# Patient Record
Sex: MALE
[De-identification: names, ages, dates, MRNs, and addresses within clinical notes are randomized per-mention and may not be internally consistent; named-entity substitution may affect disease eponyms.]

---

## 2019-08-04 ENCOUNTER — HOSPITAL ENCOUNTER (EMERGENCY)
Dept: HOSPITAL 5 - ED | Age: 54
LOS: 1 days | Discharge: PSYCHIATRIC OR SUBSTANCE USE FACILITY | End: 2019-08-05
Payer: MEDICAID

## 2019-08-04 DIAGNOSIS — F17.200: ICD-10-CM

## 2019-08-04 DIAGNOSIS — F32.89: Primary | ICD-10-CM

## 2019-08-04 DIAGNOSIS — F14.10: ICD-10-CM

## 2019-08-04 LAB
BASOPHILS # (AUTO): 0.1 K/MM3 (ref 0–0.1)
BASOPHILS NFR BLD AUTO: 1 % (ref 0–1.8)
BUN SERPL-MCNC: 20 MG/DL (ref 9–20)
BUN/CREAT SERPL: 20 %
CALCIUM SERPL-MCNC: 9.2 MG/DL (ref 8.4–10.2)
EOSINOPHIL # BLD AUTO: 0.1 K/MM3 (ref 0–0.4)
EOSINOPHIL NFR BLD AUTO: 0.8 % (ref 0–4.3)
HCT VFR BLD CALC: 47.9 % (ref 35.5–45.6)
HEMOLYSIS INDEX: 41
HGB BLD-MCNC: 16.5 GM/DL (ref 11.8–15.2)
LYMPHOCYTES # BLD AUTO: 1.7 K/MM3 (ref 1.2–5.4)
LYMPHOCYTES NFR BLD AUTO: 25.4 % (ref 13.4–35)
MCHC RBC AUTO-ENTMCNC: 34 % (ref 32–34)
MCV RBC AUTO: 93 FL (ref 84–94)
MONOCYTES # (AUTO): 0.7 K/MM3 (ref 0–0.8)
MONOCYTES % (AUTO): 9.6 % (ref 0–7.3)
PLATELET # BLD: 220 K/MM3 (ref 140–440)
RBC # BLD AUTO: 5.15 M/MM3 (ref 3.65–5.03)

## 2019-08-04 PROCEDURE — G0480 DRUG TEST DEF 1-7 CLASSES: HCPCS

## 2019-08-04 PROCEDURE — 85025 COMPLETE CBC W/AUTO DIFF WBC: CPT

## 2019-08-04 PROCEDURE — 80307 DRUG TEST PRSMV CHEM ANLYZR: CPT

## 2019-08-04 PROCEDURE — 36415 COLL VENOUS BLD VENIPUNCTURE: CPT

## 2019-08-04 PROCEDURE — 80320 DRUG SCREEN QUANTALCOHOLS: CPT

## 2019-08-04 PROCEDURE — 81001 URINALYSIS AUTO W/SCOPE: CPT

## 2019-08-04 PROCEDURE — 80048 BASIC METABOLIC PNL TOTAL CA: CPT

## 2019-08-04 NOTE — EMERGENCY DEPARTMENT REPORT
ED Psych HPI





- General


Chief Complaint: Psych


Stated Complaint: SI


Source: patient


Mode of arrival: Ambulatory





- History of Present Illness


Initial Comments: 


Patient is a 54-year-old male past medical history of bipolar disorder presents 

with suicidal thoughts and depression.  Patient states that he has suicidal 

ideation and that the recent is that he stopped taking his Prozac 6 months ago 

and his planted went suicidal message upper front of a car.  Patient has no 

homicidal ideation.  Nothing makes patient suicidal ideation better and nothing 

makes it worse.








- Related Data


                                    Allergies











Allergy/AdvReac Type Severity Reaction Status Date / Time


 


No Known Allergies Allergy   Unverified 08/04/19 10:08














ED Review of Systems


ROS: 


Stated complaint: SI


Other details as noted in HPI





Constitutional: denies: chills, fever


Eyes: denies: eye pain, eye discharge, vision change


ENT: denies: ear pain, throat pain


Respiratory: denies: cough, shortness of breath, wheezing


Cardiovascular: denies: chest pain, palpitations


Endocrine: no symptoms reported


Gastrointestinal: denies: abdominal pain, nausea, diarrhea


Genitourinary: denies: urgency, dysuria


Musculoskeletal: denies: back pain, joint swelling, arthralgia


Skin: denies: rash, lesions


Neurological: denies: headache, weakness, paresthesias


Psychiatric: suicidal thoughts.  denies: anxiety, depression


Hematological/Lymphatic: denies: easy bleeding, easy bruising





ED Past Medical Hx





- Social History


Smoking Status: Current Every Day Smoker


Substance Use Type: Alcohol, Cocaine





ED Physical Exam





- General


Limitations: No Limitations


General appearance: alert, in no apparent distress





- Head


Head exam: Present: atraumatic, normocephalic





- Eye


Eye exam: Present: normal appearance





- ENT


ENT exam: Present: mucous membranes moist





- Neck


Neck exam: Present: normal inspection





- Respiratory


Respiratory exam: Present: normal lung sounds bilaterally.  Absent: respiratory 

distress





- Cardiovascular


Cardiovascular Exam: Present: regular rate, normal rhythm.  Absent: systolic 

murmur, diastolic murmur, rubs, gallop





- GI/Abdominal


GI/Abdominal exam: Present: soft, normal bowel sounds





- Rectal


Rectal exam: Present: deferred





- Extremities Exam


Extremities exam: Present: normal inspection





- Back Exam


Back exam: Present: normal inspection





- Neurological Exam


Neurological exam: Present: alert, oriented X3





- Psychiatric


Psychiatric exam: Present: suicidal ideation





- Skin


Skin exam: Present: warm, dry, intact, normal color.  Absent: rash





ED Course


                                   Vital Signs











  08/04/19





  10:12


 


Temperature 97.6 F


 


Pulse Rate 91 H


 


Respiratory 16





Rate 


 


Blood Pressure 156/105


 


O2 Sat by Pulse 98





Oximetry 














ED Medical Decision Making





- Lab Data


Result diagrams: 


                                 08/04/19 10:27





                                 08/04/19 10:27








                                   Lab Results











  08/04/19 08/04/19 08/04/19 Range/Units





  10:27 10:27 10:27 


 


WBC     (4.5-11.0)  K/mm3


 


RBC     (3.65-5.03)  M/mm3


 


Hgb     (11.8-15.2)  gm/dl


 


Hct     (35.5-45.6)  %


 


MCV     (84-94)  fl


 


MCH     (28-32)  pg


 


MCHC     (32-34)  %


 


RDW     (13.2-15.2)  %


 


Plt Count     (140-440)  K/mm3


 


Lymph % (Auto)     (13.4-35.0)  %


 


Mono % (Auto)     (0.0-7.3)  %


 


Eos % (Auto)     (0.0-4.3)  %


 


Baso % (Auto)     (0.0-1.8)  %


 


Lymph #     (1.2-5.4)  K/mm3


 


Mono #     (0.0-0.8)  K/mm3


 


Eos #     (0.0-0.4)  K/mm3


 


Baso #     (0.0-0.1)  K/mm3


 


Seg Neutrophils %     (40.0-70.0)  %


 


Seg Neutrophils #     (1.8-7.7)  K/mm3


 


Sodium    138  (137-145)  mmol/L


 


Potassium    4.1  (3.6-5.0)  mmol/L


 


Chloride    101.6  ()  mmol/L


 


Carbon Dioxide    22  (22-30)  mmol/L


 


Anion Gap    19  mmol/L


 


BUN    20  (9-20)  mg/dL


 


Creatinine    1.0  (0.8-1.5)  mg/dL


 


Estimated GFR    > 60  ml/min


 


BUN/Creatinine Ratio    20  %


 


Glucose    111 H  ()  mg/dL


 


Calcium    9.2  (8.4-10.2)  mg/dL


 


Salicylates  < 0.3 L    (2.8-20.0)  mg/dL


 


Acetaminophen   < 5.0 L   (10.0-30.0)  ug/mL


 


Plasma/Serum Alcohol     (0-0.07)  %














  08/04/19 08/04/19 Range/Units





  10:27 10:27 


 


WBC   6.8  (4.5-11.0)  K/mm3


 


RBC   5.15 H  (3.65-5.03)  M/mm3


 


Hgb   16.5 H  (11.8-15.2)  gm/dl


 


Hct   47.9 H  (35.5-45.6)  %


 


MCV   93  (84-94)  fl


 


MCH   32  (28-32)  pg


 


MCHC   34  (32-34)  %


 


RDW   14.2  (13.2-15.2)  %


 


Plt Count   220  (140-440)  K/mm3


 


Lymph % (Auto)   25.4  (13.4-35.0)  %


 


Mono % (Auto)   9.6 H  (0.0-7.3)  %


 


Eos % (Auto)   0.8  (0.0-4.3)  %


 


Baso % (Auto)   1.0  (0.0-1.8)  %


 


Lymph #   1.7  (1.2-5.4)  K/mm3


 


Mono #   0.7  (0.0-0.8)  K/mm3


 


Eos #   0.1  (0.0-0.4)  K/mm3


 


Baso #   0.1  (0.0-0.1)  K/mm3


 


Seg Neutrophils %   63.2  (40.0-70.0)  %


 


Seg Neutrophils #   4.3  (1.8-7.7)  K/mm3


 


Sodium    (137-145)  mmol/L


 


Potassium    (3.6-5.0)  mmol/L


 


Chloride    ()  mmol/L


 


Carbon Dioxide    (22-30)  mmol/L


 


Anion Gap    mmol/L


 


BUN    (9-20)  mg/dL


 


Creatinine    (0.8-1.5)  mg/dL


 


Estimated GFR    ml/min


 


BUN/Creatinine Ratio    %


 


Glucose    ()  mg/dL


 


Calcium    (8.4-10.2)  mg/dL


 


Salicylates    (2.8-20.0)  mg/dL


 


Acetaminophen    (10.0-30.0)  ug/mL


 


Plasma/Serum Alcohol  < 0.01   (0-0.07)  %














- Medical Decision Making


Medical diagnosis: Suicidal ideation secondary to stress


Differential medical diagnosis: Substance induced mood disorder, uncontrolled 

bipolar disorder





I will get a 1013 I will get CBC BMP and I'll have mental health evaluate the 

patient.








Patient has been medically cleared


Critical care attestation.: 


If time is entered above; I have spent that time in minutes in the direct care 

of this critically ill patient, excluding procedure time.








ED Disposition


Clinical Impression: 


 Suicidal ideation





Disposition: DC/TX-[facility] PSY HOSP/PSY UNIT


Is pt being admited?: No


Does the pt Need Aspirin: No


Condition: Stable


Referrals: 


VEL BRADLEY MD [Primary Care Provider] - 3-5 Days

## 2019-08-05 VITALS — DIASTOLIC BLOOD PRESSURE: 90 MMHG | SYSTOLIC BLOOD PRESSURE: 156 MMHG

## 2019-08-05 LAB
BENZODIAZEPINES SCREEN,URINE: (no result)
BILIRUB UR QL STRIP: (no result)
BLOOD UR QL VISUAL: (no result)
METHADONE SCREEN,URINE: (no result)
MUCOUS THREADS #/AREA URNS HPF: (no result) /HPF
OPIATE SCREEN,URINE: (no result)
PH UR STRIP: 5 [PH] (ref 5–7)
PROT UR STRIP-MCNC: (no result) MG/DL
RBC #/AREA URNS HPF: 2 /HPF (ref 0–6)
UROBILINOGEN UR-MCNC: < 2 MG/DL (ref ?–2)
WBC #/AREA URNS HPF: 2 /HPF (ref 0–6)

## 2019-08-05 NOTE — CONSULTATION
History of Present Illness





- Reason for Consult


Consult date: 08/05/19


Reason for consult: Mental Health Evaluation


Requesting physician: ZAYDA NUGENT





- Chief Complaint


Chief complaint: 


'I have a lot going on with me"








- History of Present Psychiatric Illness


54-year-old white male who presented to the ER for SI's and depression. Today 

the patient was calm and cooperative during the assessment.he is vague about the

life stressors that is triggering his depression. He stated that he have been 

"binging" on cocaine the past several days to lessen his depression. He rate his

depression 7/10, with 10 being the worse. He stated that he haven't been 

complaint with his psy medication for the past 8 months. he acknowledged a past 

suicide attempt by overdosing on pills. He continue to endorse SI's when asked, 

but would not confirm or deny a suicide plan. He denies HI's and AVH's. He 

denies alcohol consumption (etoh).  





Medications and Allergies


                                    Allergies











Allergy/AdvReac Type Severity Reaction Status Date / Time


 


No Known Allergies Allergy   Unverified 08/04/19 10:08














Past psychiatric history





- Past Medical History


Past Medical History: No medical history


Past Surgical History: No surgical history





Mental Status Exam





- Vital signs


                                Last Vital Signs











Temp  97.7 F   08/05/19 07:45


 


Pulse  67   08/05/19 07:45


 


Resp  16   08/05/19 07:45


 


BP  146/94   08/05/19 07:45


 


Pulse Ox  98   08/05/19 07:45














- Exam


Narrative exam: 


MSE:





 Appearance: calm, cooperative   


 Behavior: regular eye contact    


 Speech: regular rate and tone   


 Mood: "depressed"


 Affect: congruent to mood 


 Thought Process: somewhat circumstantial       


 Thought Content: denies HI's and AVH's 


 Motor Activity: sitting up in bed


 Cognition: A/O x3


 Insight: variable 


 Judgment: poor 











Results


Result Diagrams: 


                                 08/04/19 10:27





                                 08/04/19 10:27


All other labs normal.








Assessment and Plan


Assessment and plan: 


Impression: MDD. Substance Use DO (cocaine)Today the patient was calm and coop

erative during the assessment. The patient endorses SI's.   





DDx: Substance Induced Mood DO





recommendation/Plan: Continue 1013.





Dipso: The patient can follow was accepted at [facility] 

for inpatient psy services.  





Will staff with Dr JOAQUÍN Guy.